# Patient Record
Sex: MALE | Race: BLACK OR AFRICAN AMERICAN | NOT HISPANIC OR LATINO | Employment: FULL TIME | URBAN - METROPOLITAN AREA
[De-identification: names, ages, dates, MRNs, and addresses within clinical notes are randomized per-mention and may not be internally consistent; named-entity substitution may affect disease eponyms.]

---

## 2019-05-16 PROBLEM — N18.9 CHRONIC KIDNEY DISEASE: Status: ACTIVE | Noted: 2019-05-16

## 2019-05-16 PROBLEM — I10 ESSENTIAL HYPERTENSION: Status: ACTIVE | Noted: 2019-05-16

## 2019-05-16 PROBLEM — E11.9 TYPE 2 DIABETES MELLITUS WITHOUT COMPLICATION, WITHOUT LONG-TERM CURRENT USE OF INSULIN: Status: ACTIVE | Noted: 2019-05-16

## 2019-05-16 PROBLEM — M1A.9XX0 CHRONIC GOUT WITHOUT TOPHUS: Status: ACTIVE | Noted: 2019-05-16

## 2019-05-16 PROBLEM — E78.2 MIXED HYPERLIPIDEMIA: Status: ACTIVE | Noted: 2019-05-16

## 2019-10-10 PROBLEM — N25.81 SECONDARY HYPERPARATHYROIDISM: Chronic | Status: ACTIVE | Noted: 2019-10-10

## 2019-10-10 PROBLEM — N18.4 CKD (CHRONIC KIDNEY DISEASE) STAGE 4, GFR 15-29 ML/MIN: Chronic | Status: ACTIVE | Noted: 2019-05-16

## 2019-10-10 PROBLEM — N18.4 ANEMIA IN STAGE 4 CHRONIC KIDNEY DISEASE: Chronic | Status: ACTIVE | Noted: 2019-10-10

## 2019-10-10 PROBLEM — D63.1 ANEMIA IN STAGE 4 CHRONIC KIDNEY DISEASE: Chronic | Status: ACTIVE | Noted: 2019-10-10

## 2019-11-18 PROBLEM — D63.1 ANEMIA OF CHRONIC RENAL FAILURE: Status: ACTIVE | Noted: 2019-11-18

## 2019-11-18 PROBLEM — N18.9 ANEMIA OF CHRONIC RENAL FAILURE: Status: ACTIVE | Noted: 2019-11-18

## 2019-11-18 PROBLEM — N18.4 CHRONIC KIDNEY DISEASE, STAGE IV (SEVERE): Status: ACTIVE | Noted: 2019-11-18

## 2019-11-26 PROBLEM — N18.4 CHRONIC KIDNEY DISEASE, STAGE IV (SEVERE): Status: RESOLVED | Noted: 2019-11-18 | Resolved: 2019-11-26

## 2019-11-26 PROBLEM — D63.1 ANEMIA OF CHRONIC RENAL FAILURE: Status: RESOLVED | Noted: 2019-11-18 | Resolved: 2019-11-26

## 2019-11-26 PROBLEM — N18.9 ANEMIA OF CHRONIC RENAL FAILURE: Status: RESOLVED | Noted: 2019-11-18 | Resolved: 2019-11-26

## 2020-03-24 PROBLEM — N17.9 AKI (ACUTE KIDNEY INJURY): Status: ACTIVE | Noted: 2020-03-24

## 2021-04-21 DIAGNOSIS — Z12.11 COLON CANCER SCREENING: ICD-10-CM

## 2021-04-21 DIAGNOSIS — Z11.59 NEED FOR HEPATITIS C SCREENING TEST: ICD-10-CM

## 2021-04-28 DIAGNOSIS — E11.9 TYPE 2 DIABETES MELLITUS WITHOUT COMPLICATION, WITHOUT LONG-TERM CURRENT USE OF INSULIN: ICD-10-CM

## 2021-05-12 ENCOUNTER — PATIENT MESSAGE (OUTPATIENT)
Dept: RESEARCH | Facility: HOSPITAL | Age: 66
End: 2021-05-12

## 2023-07-19 PROBLEM — M17.0 PRIMARY OSTEOARTHRITIS OF BOTH KNEES: Status: ACTIVE | Noted: 2020-06-29

## 2023-07-19 PROBLEM — R80.1 PERSISTENT PROTEINURIA: Status: ACTIVE | Noted: 2018-04-26

## 2023-07-19 PROBLEM — E55.9 VITAMIN D DEFICIENCY: Status: ACTIVE | Noted: 2021-11-02

## 2023-07-19 PROBLEM — I51.7 LEFT VENTRICULAR HYPERTROPHY: Status: ACTIVE | Noted: 2020-12-21

## 2023-07-19 PROBLEM — K59.00 CONSTIPATION: Status: ACTIVE | Noted: 2021-02-22

## 2023-11-18 PROBLEM — M1A.3620: Status: ACTIVE | Noted: 2020-07-29

## 2023-11-18 PROBLEM — I24.9 ACUTE CORONARY SYNDROME: Status: ACTIVE | Noted: 2019-09-01

## 2023-11-19 PROBLEM — D50.9 MICROCYTIC ANEMIA: Status: ACTIVE | Noted: 2023-11-19

## 2023-11-19 PROBLEM — R18.8 FREE FLUID IN PELVIS: Status: ACTIVE | Noted: 2023-11-19

## 2023-11-19 PROBLEM — I10 PRIMARY HYPERTENSION: Chronic | Status: ACTIVE | Noted: 2023-11-19

## 2023-11-19 PROBLEM — R79.89 AZOTEMIA: Status: ACTIVE | Noted: 2023-11-19

## 2023-11-19 PROBLEM — R79.89 ELEVATED SERUM CREATININE: Status: ACTIVE | Noted: 2023-11-19

## 2023-11-19 PROBLEM — R94.4 DECREASED GFR: Status: ACTIVE | Noted: 2023-11-19

## 2023-11-19 PROBLEM — N18.6 ESRD (END STAGE RENAL DISEASE): Chronic | Status: ACTIVE | Noted: 2023-11-19

## 2023-11-19 PROBLEM — E87.5 HYPERKALEMIA: Status: ACTIVE | Noted: 2023-11-19

## 2023-11-19 PROBLEM — D72.829 LEUKOCYTOSIS: Status: ACTIVE | Noted: 2023-11-19
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
## 2023-11-19 PROBLEM — S42.401A CLOSED FRACTURE OF RIGHT ELBOW: Status: ACTIVE | Noted: 2023-11-19

## 2023-11-19 PROBLEM — I10 PRIMARY HYPERTENSION: Status: ACTIVE | Noted: 2023-11-19

## 2023-11-19 PROBLEM — I73.9 PAD (PERIPHERAL ARTERY DISEASE): Chronic | Status: ACTIVE | Noted: 2023-11-19

## 2023-11-19 PROBLEM — I70.0 ATHEROSCLEROSIS OF AORTA: Chronic | Status: ACTIVE | Noted: 2023-11-19
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
## 2023-11-19 PROBLEM — E83.41 HYPERMAGNESEMIA: Status: ACTIVE | Noted: 2023-11-19

## 2023-11-19 PROBLEM — E83.39 HYPERPHOSPHATEMIA: Status: ACTIVE | Noted: 2023-11-19

## 2023-11-19 PROBLEM — I73.9 PAD (PERIPHERAL ARTERY DISEASE): Status: ACTIVE | Noted: 2023-11-19

## 2023-11-19 PROBLEM — I70.0 ATHEROSCLEROSIS OF AORTA: Status: ACTIVE | Noted: 2023-11-19

## 2023-11-19 PROBLEM — N18.6 ESRD (END STAGE RENAL DISEASE): Status: ACTIVE | Noted: 2023-11-19

## 2023-11-28 PROBLEM — K66.1 HEMOPERITONEUM: Status: ACTIVE | Noted: 2023-11-28

## 2023-11-28 PROBLEM — S36.039A SPLENIC LACERATION: Status: ACTIVE | Noted: 2023-11-28

## 2023-11-29 PROBLEM — E66.811 OBESITY, CLASS I, BMI 30-34.9: Chronic | Status: ACTIVE | Noted: 2023-11-29

## 2023-11-29 PROBLEM — E66.811 OBESITY, CLASS I, BMI 30-34.9: Status: ACTIVE | Noted: 2023-11-29

## 2023-11-29 PROBLEM — T79.4XXA TRAUMATIC HEMORRHAGIC SHOCK: Status: RESOLVED | Noted: 2023-11-29 | Resolved: 2023-11-29

## 2023-11-29 PROBLEM — K86.89 PANCREATIC MASS: Chronic | Status: ACTIVE | Noted: 2023-11-29

## 2023-11-29 PROBLEM — E78.2 MIXED HYPERLIPIDEMIA: Chronic | Status: ACTIVE | Noted: 2019-05-16

## 2023-11-29 PROBLEM — Z99.2 ESRD (END STAGE RENAL DISEASE) ON DIALYSIS: Status: ACTIVE | Noted: 2023-11-29

## 2023-11-29 PROBLEM — K86.89 PANCREATIC MASS: Status: ACTIVE | Noted: 2023-11-29

## 2023-11-29 PROBLEM — R79.89 ELEVATED SERUM CREATININE: Chronic | Status: ACTIVE | Noted: 2023-11-19

## 2023-11-29 PROBLEM — H54.40 BLINDNESS OF RIGHT EYE: Status: ACTIVE | Noted: 2023-07-27

## 2023-11-29 PROBLEM — R79.89 AZOTEMIA: Chronic | Status: ACTIVE | Noted: 2023-11-19

## 2023-11-29 PROBLEM — R94.4 DECREASED GFR: Chronic | Status: ACTIVE | Noted: 2023-11-19

## 2023-11-29 PROBLEM — E87.20 METABOLIC ACIDOSIS: Status: ACTIVE | Noted: 2023-11-29

## 2023-11-29 PROBLEM — E66.9 OBESITY, CLASS I, BMI 30-34.9: Status: ACTIVE | Noted: 2023-11-29

## 2023-11-29 PROBLEM — M1A.3620: Chronic | Status: ACTIVE | Noted: 2020-07-29

## 2023-11-29 PROBLEM — S82.61XA CLOSED FRACTURE OF DISTAL LATERAL MALLEOLUS OF RIGHT FIBULA: Status: ACTIVE | Noted: 2023-11-29

## 2023-11-29 PROBLEM — M1A.9XX0 CHRONIC GOUT WITHOUT TOPHUS: Chronic | Status: ACTIVE | Noted: 2019-05-16

## 2023-11-29 PROBLEM — Z99.2 ESRD (END STAGE RENAL DISEASE) ON DIALYSIS: Chronic | Status: ACTIVE | Noted: 2023-11-29

## 2023-11-29 PROBLEM — E66.9 OBESITY, CLASS I, BMI 30-34.9: Chronic | Status: ACTIVE | Noted: 2023-11-29

## 2023-11-29 PROBLEM — N18.6 ESRD (END STAGE RENAL DISEASE) ON DIALYSIS: Chronic | Status: ACTIVE | Noted: 2023-11-29

## 2023-11-29 PROBLEM — T79.4XXA TRAUMATIC HEMORRHAGIC SHOCK: Status: ACTIVE | Noted: 2023-11-29

## 2023-11-29 PROBLEM — D62 ACUTE BLOOD LOSS ANEMIA: Status: ACTIVE | Noted: 2023-11-29

## 2023-11-29 PROBLEM — S36.899A TRAUMATIC HEMOPERITONEUM: Status: ACTIVE | Noted: 2023-11-28

## 2023-11-29 PROBLEM — E11.9 TYPE 2 DIABETES MELLITUS WITHOUT COMPLICATION, WITHOUT LONG-TERM CURRENT USE OF INSULIN: Chronic | Status: ACTIVE | Noted: 2019-05-16

## 2023-11-29 PROBLEM — N18.6 ESRD (END STAGE RENAL DISEASE) ON DIALYSIS: Status: ACTIVE | Noted: 2023-11-29

## 2023-12-01 PROBLEM — E87.1 HYPONATREMIA: Status: ACTIVE | Noted: 2023-12-01

## 2023-12-01 PROBLEM — D63.1 ANEMIA IN ESRD (END-STAGE RENAL DISEASE): Status: ACTIVE | Noted: 2019-10-10

## 2023-12-01 PROBLEM — N18.6 ANEMIA IN ESRD (END-STAGE RENAL DISEASE): Status: ACTIVE | Noted: 2019-10-10

## 2023-12-03 PROBLEM — E87.5 HYPERKALEMIA: Status: RESOLVED | Noted: 2023-11-19 | Resolved: 2023-12-03

## 2023-12-03 PROBLEM — E87.20 METABOLIC ACIDOSIS: Status: RESOLVED | Noted: 2023-11-29 | Resolved: 2023-12-03

## 2023-12-03 PROBLEM — E87.1 HYPONATREMIA: Status: RESOLVED | Noted: 2023-12-01 | Resolved: 2023-12-03

## 2023-12-03 PROBLEM — R79.89 AZOTEMIA: Status: RESOLVED | Noted: 2023-11-19 | Resolved: 2023-12-03

## 2023-12-03 PROBLEM — D72.829 LEUKOCYTOSIS: Status: RESOLVED | Noted: 2023-11-19 | Resolved: 2023-12-03

## 2023-12-03 PROBLEM — S36.899A TRAUMATIC HEMOPERITONEUM: Status: RESOLVED | Noted: 2023-11-28 | Resolved: 2023-12-03

## 2023-12-03 PROBLEM — K59.00 CONSTIPATION: Status: RESOLVED | Noted: 2021-02-22 | Resolved: 2023-12-03

## 2023-12-12 PROBLEM — K86.2 CYSTIC MASS OF PANCREAS: Status: ACTIVE | Noted: 2023-11-29

## 2024-03-05 PROBLEM — D49.0 IPMN (INTRADUCTAL PAPILLARY MUCINOUS NEOPLASM): Status: ACTIVE | Noted: 2024-03-05

## 2025-02-12 ENCOUNTER — NURSE TRIAGE (OUTPATIENT)
Dept: ADMINISTRATIVE | Facility: CLINIC | Age: 70
End: 2025-02-12

## 2025-02-12 NOTE — TELEPHONE ENCOUNTER
Attempted to call patient from post discharge tracker day 1. 2 attempts and both calls went to voice mail but mailbox is full and unable to leave a message. No contact made with patient by this nurse.     Reason for Disposition   Second attempt to contact caller AND no contact made. Phone number verified.    Protocols used: No Contact or Duplicate Contact Call-A-[place]

## 2025-02-13 ENCOUNTER — TELEPHONE (OUTPATIENT)
Dept: ADMINISTRATIVE | Facility: CLINIC | Age: 70
End: 2025-02-13

## 2025-02-13 NOTE — TELEPHONE ENCOUNTER
"Phoned patient in response to reply of "2" to post-discharge texting tracker. Call went to voicemail. Left message, "Hi. This is Sharda with [facility]'s post-discharge texting tracker. We received your reply of "2" to our text regarding medications or supplies which may have been prescribed for you at discharge. If you do have questions or concerns regarding this or are experiencing symptoms, please call our 24 hour [facility] on-call line at [phone] to speak with one of our nurses. Thank you and have a great day."             "

## 2025-02-14 PROBLEM — I25.10 CAD (CORONARY ARTERY DISEASE): Status: ACTIVE | Noted: 2025-02-14

## 2025-02-14 PROBLEM — I10 HTN (HYPERTENSION): Status: ACTIVE | Noted: 2025-02-14

## 2025-02-14 PROBLEM — R53.83 FATIGUE: Status: ACTIVE | Noted: 2025-02-14
